# Patient Record
Sex: FEMALE | Race: WHITE | NOT HISPANIC OR LATINO | Employment: OTHER
[De-identification: names, ages, dates, MRNs, and addresses within clinical notes are randomized per-mention and may not be internally consistent; named-entity substitution may affect disease eponyms.]

---

## 2017-06-10 ENCOUNTER — HEALTH MAINTENANCE LETTER (OUTPATIENT)
Age: 57
End: 2017-06-10

## 2019-09-30 ENCOUNTER — HEALTH MAINTENANCE LETTER (OUTPATIENT)
Age: 59
End: 2019-09-30

## 2021-01-15 ENCOUNTER — HEALTH MAINTENANCE LETTER (OUTPATIENT)
Age: 61
End: 2021-01-15

## 2021-10-24 ENCOUNTER — HEALTH MAINTENANCE LETTER (OUTPATIENT)
Age: 61
End: 2021-10-24

## 2022-02-13 ENCOUNTER — HEALTH MAINTENANCE LETTER (OUTPATIENT)
Age: 62
End: 2022-02-13

## 2022-05-23 ENCOUNTER — LAB REQUISITION (OUTPATIENT)
Dept: LAB | Facility: CLINIC | Age: 62
End: 2022-05-23

## 2022-05-23 LAB
CRP SERPL HS-MCNC: 0.8 MG/L
ESTRADIOL SERPL-MCNC: 13 PG/ML
FSH SERPL-ACNC: 129.1 IU/L
PROGEST SERPL-MCNC: <0.2 NG/ML

## 2022-05-23 PROCEDURE — 82306 VITAMIN D 25 HYDROXY: CPT | Performed by: FAMILY MEDICINE

## 2022-05-23 PROCEDURE — 83695 ASSAY OF LIPOPROTEIN(A): CPT | Performed by: FAMILY MEDICINE

## 2022-05-23 PROCEDURE — 83001 ASSAY OF GONADOTROPIN (FSH): CPT | Performed by: FAMILY MEDICINE

## 2022-05-23 PROCEDURE — 84144 ASSAY OF PROGESTERONE: CPT | Performed by: FAMILY MEDICINE

## 2022-05-23 PROCEDURE — 84140 ASSAY OF PREGNENOLONE: CPT | Performed by: FAMILY MEDICINE

## 2022-05-23 PROCEDURE — 82679 ASSAY OF ESTRONE: CPT | Performed by: FAMILY MEDICINE

## 2022-05-23 PROCEDURE — 82670 ASSAY OF TOTAL ESTRADIOL: CPT | Performed by: FAMILY MEDICINE

## 2022-05-23 PROCEDURE — 82627 DEHYDROEPIANDROSTERONE: CPT | Performed by: FAMILY MEDICINE

## 2022-05-23 PROCEDURE — 84403 ASSAY OF TOTAL TESTOSTERONE: CPT | Performed by: FAMILY MEDICINE

## 2022-05-23 PROCEDURE — 86141 C-REACTIVE PROTEIN HS: CPT | Performed by: FAMILY MEDICINE

## 2022-05-23 PROCEDURE — 84270 ASSAY OF SEX HORMONE GLOBUL: CPT | Performed by: FAMILY MEDICINE

## 2022-05-24 LAB
DEPRECATED CALCIDIOL+CALCIFEROL SERPL-MC: 37 UG/L (ref 20–75)
DHEA-S SERPL-MCNC: 28 UG/DL (ref 35–430)
SHBG SERPL-SCNC: 204 NMOL/L (ref 30–135)

## 2022-05-25 LAB
TESTOST FREE SERPL-MCNC: 0.04 NG/DL
TESTOST SERPL-MCNC: 10 NG/DL (ref 8–60)

## 2022-05-28 LAB — LPA SERPL-MCNC: 74 MG/DL

## 2022-06-03 LAB — ESTRONE SERPL-MCNC: 8.7 PG/ML

## 2022-06-15 ENCOUNTER — LAB REQUISITION (OUTPATIENT)
Dept: LAB | Facility: CLINIC | Age: 62
End: 2022-06-15

## 2022-06-15 PROCEDURE — 84140 ASSAY OF PREGNENOLONE: CPT | Performed by: FAMILY MEDICINE

## 2022-06-19 LAB — PREG SERPL-MCNC: 38 NG/DL

## 2022-10-16 ENCOUNTER — HEALTH MAINTENANCE LETTER (OUTPATIENT)
Age: 62
End: 2022-10-16

## 2023-03-26 ENCOUNTER — HEALTH MAINTENANCE LETTER (OUTPATIENT)
Age: 63
End: 2023-03-26

## 2023-09-23 ENCOUNTER — HOSPITAL ENCOUNTER (EMERGENCY)
Facility: CLINIC | Age: 63
Discharge: HOME | End: 2023-09-23
Attending: EMERGENCY MEDICINE | Admitting: EMERGENCY MEDICINE
Payer: COMMERCIAL

## 2023-09-23 ENCOUNTER — HOSPITAL ENCOUNTER (EMERGENCY)
Facility: CLINIC | Age: 63
Discharge: HOME | End: 2023-09-23
Attending: FAMILY MEDICINE | Admitting: FAMILY MEDICINE
Payer: COMMERCIAL

## 2023-09-23 ENCOUNTER — TELEPHONE (OUTPATIENT)
Dept: OPHTHALMOLOGY | Facility: CLINIC | Age: 63
End: 2023-09-23
Payer: COMMERCIAL

## 2023-09-23 VITALS
RESPIRATION RATE: 18 BRPM | DIASTOLIC BLOOD PRESSURE: 61 MMHG | TEMPERATURE: 97.9 F | SYSTOLIC BLOOD PRESSURE: 115 MMHG | BODY MASS INDEX: 26.98 KG/M2 | HEIGHT: 68 IN | WEIGHT: 178 LBS | OXYGEN SATURATION: 100 % | HEART RATE: 99 BPM

## 2023-09-23 VITALS
TEMPERATURE: 98.1 F | HEART RATE: 68 BPM | DIASTOLIC BLOOD PRESSURE: 80 MMHG | RESPIRATION RATE: 16 BRPM | SYSTOLIC BLOOD PRESSURE: 150 MMHG | OXYGEN SATURATION: 98 % | HEIGHT: 68 IN | BODY MASS INDEX: 27.06 KG/M2

## 2023-09-23 DIAGNOSIS — H57.11 ACUTE RIGHT EYE PAIN: ICD-10-CM

## 2023-09-23 PROCEDURE — 99282 EMERGENCY DEPT VISIT SF MDM: CPT | Performed by: FAMILY MEDICINE

## 2023-09-23 PROCEDURE — 99283 EMERGENCY DEPT VISIT LOW MDM: CPT | Performed by: FAMILY MEDICINE

## 2023-09-23 PROCEDURE — 99282 EMERGENCY DEPT VISIT SF MDM: CPT | Mod: 25

## 2023-09-23 RX ORDER — PROPARACAINE HYDROCHLORIDE 5 MG/ML
2 SOLUTION/ DROPS OPHTHALMIC ONCE
Status: DISCONTINUED | OUTPATIENT
Start: 2023-09-23 | End: 2023-09-23 | Stop reason: HOSPADM

## 2023-09-23 ASSESSMENT — ACTIVITIES OF DAILY LIVING (ADL)
ADLS_ACUITY_SCORE: 33
ADLS_ACUITY_SCORE: 35
ADLS_ACUITY_SCORE: 33
ADLS_ACUITY_SCORE: 33

## 2023-09-23 ASSESSMENT — VISUAL ACUITY
OS: 20/50;WITH CORRECTIVE LENSES
OS: 20/100;WITH CORRECTIVE LENSES
OU: 20/50;WITH CORRECTIVE LENSES
OD: 20/80;WITH CORRECTIVE LENSES
OD: 20/50;WITH CORRECTIVE LENSES
OU: OTHER (SEE COMMENTS)

## 2023-09-23 NOTE — ED PROVIDER NOTES
"  History     Chief Complaint:  Eye Pain       The history is provided by the patient.      Michelle Miramontes is a 63 year old female with a history of diabetes and hypertension who presents with right eye pain. Patient reports she was seen at the eye doctor 3 days ago to get injections for her diabetic retinopathy, macular edema. She reports everything went fine and she has been putting in her eye drops 3-4 times a day since the injection. Patient reports this morning when she woke up she had right eye pain and increased light sensitivity. She says it hurts to move her right eye. She denies vision changes or any discharge from her eye. Patient reports she gets injections every 10 weeks and this was her 67th injection. She reports she has only had one eye infection and that was over 5 years ago. Patient reports her symptoms feel similar to when she had her eye infection. She denies trying at home treatments for pain. She denies fever, chills or sweats as well. She notes her eye doctor is Dr. Chevy El.     Independent Historian:   None - Patient Only    Review of External Notes:   None      Medications:    Xanax  Aspirin   Cholecalciferol     Past Medical History:    Anxiety   Depression   Diabetic retinopathy  Hypertension   Insomnia   Vitamin D deficiency   Menorrhagia    Past Surgical History:    Phacoemulsification clear cornea with standard intraocular lens implant   Tonsillectomy and adenoidectomy   Mammoplasty augmentation        Physical Exam   Patient Vitals for the past 24 hrs:   BP Temp Temp src Pulse Resp SpO2 Height Weight   09/23/23 1238 115/61 -- -- -- -- -- -- --   09/23/23 1237 -- 97.9  F (36.6  C) Temporal 99 18 100 % 1.727 m (5' 8\") 80.7 kg (178 lb)      Physical Exam  SKIN:  Warm, dry.  EYES:  Conjunctivae normal.  Normal extraocular motion.  Visual acuity reviewed.  Pupils equal round and reactive to light.  No fluorescein dye uptake over the right bulbar or palpebral conjunctiva.  Ocular " pressures measured: Right eye averages 9, left eye averages 14.  No ocular discharge.  NEUROLOGIC:  Alert, conversant.  PSYCHIATRIC:  Normal mood.    Emergency Department Course     Laboratory:  Labs Ordered and Resulted from Time of ED Arrival to Time of ED Departure - No data to display         Emergency Department Course & Assessments:         Interventions:  Medications   proparacaine (ALCAINE) 0.5 % ophthalmic solution 2 drop (has no administration in time range)   fluorescein (FUL-NINO) ophthalmic strip 1 strip (has no administration in time range)        Assessments:  1342 I obtained history and examined the patient as noted above.   1503 I rechecked and updated the patient.     Independent Interpretation (X-rays, CTs, rhythm strip):  None    Consultations/Discussion of Management or Tests:  1447 I spoke with Dr. Casillas, Ophthalmology, regarding the patient's history and presentation in the emergency department today.           Social Determinants of Health affecting care:   None    Disposition:  The patient was discharged to home.     Impression & Plan    CMS Diagnoses: None      Medical Decision Making:  This patient presents with concern of photophobia and right ocular pain after her recent ocular globe injection.  Raise concern for endophthalmitis.  Patient does lack fever.  Right eye is not per se swollen and there is no obvious infectious findings on examination.  Considered acute angle glaucoma although ocular pressures were reassuring and bilaterally comparable.  No findings of corneal or conjunctival inflammation or infection.  After ophthalmology consultation it was determined the best course of action at this point is assessment at the [facility] emergency department so ophthalmology can examine the patient.  The patient is agreeable with this plan.      Diagnosis:    ICD-10-CM    1. Acute right eye pain  H57.11            Discharge Medications:  New Prescriptions    No medications on file           Scribe Disclosure:  I, PRINCESS GUILLERMINA, am serving as a scribe at 1:46 PM on 9/23/2023 to document services personally performed by Nigel Young MD based on my observations and the provider's statements to me.     9/23/2023   Nigel Young MD Moe, James Thomas, MD  09/23/23 6605

## 2023-09-23 NOTE — ED TRIAGE NOTES
Pt from [facility] ER for opthalmology consult. R eye pain started early this a.m, light sensitivity. Hx of diabetic retinopathy, receiving eye injections regularly.      Triage Assessment       Row Name 09/23/23 1631       Triage Assessment (Adult)    Airway WDL WDL       Respiratory WDL    Respiratory WDL WDL       Skin Circulation/Temperature WDL    Skin Circulation/Temperature WDL WDL       Cardiac WDL    Cardiac WDL WDL       Peripheral/Neurovascular WDL    Peripheral Neurovascular WDL WDL       Cognitive/Neuro/Behavioral WDL    Cognitive/Neuro/Behavioral WDL WDL

## 2023-09-23 NOTE — ED TRIAGE NOTES
Pt was seen at eye doctor for injections for diabetic retinopathy, macular edema. Pt states she woke up with morning with eye pain, increased light sensitivity. Pt called the clinic and was advised to go to ED for further assessment.

## 2023-09-23 NOTE — TELEPHONE ENCOUNTER
Paged by patient regarding worsening eye pain since intravitreal injection with Dr. El at [facility] on 9/21/2023. Patient states that this is her 67th injection. Patient denies any vision changes. Denies flashes or floaters.     Counseled patient that it is difficult to provide recommendations without evaluating the patient. Patient agreeable to being seen in the emergency department.     Deysi Casillas MD  PGY-2   Department of Ophthalmology

## 2023-09-23 NOTE — CONSULTS
"  OPHTHALMOLOGY CONSULT NOTE  09/23/23    Patient: Michelle Miramontes  Consulted by: ED  Reason for Consult: eye pain after intravitreal injection    HISTORY OF PRESENTING ILLNESS:     Michelle Miramontes is a 63 year old female with history of diabetic retinopathy with macular edema who presents with worsening eye pain after an intravitreal injection 9/21/2023.     Received intravitreal injection Thursday morning. Everything was as usual Thursday and Friday. However, woke up this morning with eye pain photosensitivity that has been getting worse, prompting visit to the ED. Patient reports an \"infection\" after an injection procedure five years ago.     Denies any vision changes.     Review of systems were otherwise negative except for that which has been stated above.      OCULAR/MEDICAL/SURGICAL HISTORIES:     Past Ocular History:  Last eye exam: 9/21/2023  Prior eye surgery/laser: Cataract surgery of the right eye, PRP of the right eye   Contact lens wear: None  Glasses: Yes  Eyedrops: artificial tears, pred forte (her post-injection drops)     Family History:  Denies a family history of glaucoma or AMD     Social History:  Social History     Tobacco Use    Smoking status: Never    Smokeless tobacco: Never   Substance Use Topics    Alcohol use: Yes    Drug use: No         Past Medical History:   Diagnosis Date    Anxiety     Closed fracture of fourth metatarsal bone     right    Depression     Diabetic retinopathy (H)     Multiple lasers in both eyes.    Granuloma annulare     H/O scarlet fever     Hip pain associated with recalled total hip arthroplasty hardware (H)     left    Hypertension     Insomnia     Menorrhagia     Mitral regurgitation     Retinopathy     Shoulder pain     left    Tricuspid regurgitation     Vaginal flatus     Vitamin D deficiency        Past Surgical History:   Procedure Laterality Date    ENDOMETRIAL SAMPLING (BIOPSY)  2/2012    benign    KERATOTOMY ARCUATE WITH FEMTOSECOND LASER/IMAGING FOR " ATIOL Right 5/9/2016    Procedure: KERATOTOMY ARCUATE WITH FEMTOSECOND LASER/IMAGING FOR ATIOL;  Surgeon: Evan Kwok MD;  [facility]    MAMMOPLASTY AUGMENTATION      PHACOEMULSIFICATION CLEAR CORNEA WITH STANDARD INTRAOCULAR LENS IMPLANT Right 5/9/2016    Procedure: PHACOEMULSIFICATION CLEAR CORNEA WITH STANDARD INTRAOCULAR LENS IMPLANT;  Surgeon: Evan Kwok MD;  Location:  [facility]    tonsil and adenoidectomy      wisdom teeth extraction  2000       EXAMINATION:     Base Eye Exam       Visual Acuity (Snellen - Linear)         Right Left    Dist cc 20/25     Dist ph sc  20/40    Dist ph cc 20/20 20/40    Near cc 20/40 20/100              Tonometry (Tonopen, 5:48 PM)         Right Left    Pressure 7 12              Pupils         Pupils APD    Right PERRL None    Left PERRL None   sluggish             Visual Fields         Left Right     Full Full              Extraocular Movement         Right Left     Full, Ortho Full, Ortho              Neuro/Psych       Oriented x3: Yes    Mood/Affect: Normal              Dilation       Both eyes: 2.5% Harjinder Synephrine, 1.0% Mydriacyl @ 5:48 PM                  Slit Lamp and Fundus Exam       External Exam         Right Left    External Normal Normal              Slit Lamp Exam         Right Left    Lids/Lashes Normal Normal    Conjunctiva/Sclera White and quiet White and quiet    Cornea Clear Clear    Anterior Chamber Deep and quiet Deep and quiet    Iris Round and reactive Round and reactive    Lens PCIOL 2+ Nuclear sclerosis cataract, 1+ Posterior subcapsular cataract    Anterior Vitreous vitreous syneresis, pigmented cells vitreous syneresis              Fundus Exam         Right Left    Disc Normal Normal    C/D Ratio 0.2 0.2    Macula temporal ma and dbh and focal- laser scars temporal ma and dbh- focal laser scars    Vessels Normal Normal    Periphery 360 prp Scattered dot blot hemorrhage in 3 quads                    Labs/Studies/Imaging Performed:  B  scan:  Right eye: Formed globe, lens in place, no vitritis, no vitreous debris, no retinal detachment   Left eye:  Formed globe, lens in place, no vitritis, no vitreous debris, no retinal detachment      ASSESSMENT/PLAN:     Michelle Miramontes is a 63 year old female who presents with     # Eye pain with photophobia, right eye   - Patient presents with concern for post-injection infection   - Vision 20/20 and IOP okay, no RAPD OU  - No tenderness to palpation   - B-scan and clinical exam without vitritis or signs of endophthalmitis  - No corneal edema, no conjunctival injection, no chemosis, no hypopyon   - Given exam findings listed above, do not suspect infectious process at this time  - may be due to dry eyes due to irritation from betadine    RECOMMENDATIONS:  - No acute intervention at this time  - Stop pred forte   - start preservative free artifical tears QID prn OU  - Provided patient endophthalmitis return precautions  - Follow up with Dr. El as scheduled   - counseled return/RD precautions    It is our pleasure to participate in this patient's care and treatment. Please contact us with any further questions or concerns.    Patient discussed with senior resident Dr. Gleason.    Deysi Casillas MD  Resident Physician, PGY-2  Department of Ophthalmology

## 2023-09-23 NOTE — DISCHARGE INSTRUCTIONS
Go to the [place] emergency department for ophthalmology examination/assessment     [address] [place] [address]

## 2023-09-23 NOTE — ED PROVIDER NOTES
ED Provider Note  [facility]      History     Chief Complaint   Patient presents with    Eye Pain     HPI  Michelle Miramontes is a 63 year old female with PMH of diabetic retinopathy (receiving eye injections regularly) and HTN who presents to the ED with right eye pain. She was seen earlier today in [facility] ED for same issue, sent here for Optho consult.   Patient had injection Thursday normal symptoms afterwards and then is now noted especially today waking up having increasing eye pain etc.  Was evaluated at [facility] ED and now sent here for further evaluation by ophthalmology.  Patient denies headache nausea vomiting double vision no lasting lites no curtainlike effect or central or peripheral loss of vision.  No rash no trauma no other neuro changes now presents for evaluation.    Past Medical History  Past Medical History:   Diagnosis Date    Anxiety     Closed fracture of fourth metatarsal bone     right    Depression     Diabetic retinopathy (H)     Multiple lasers in both eyes.    Granuloma annulare     H/O scarlet fever     Hip pain associated with recalled total hip arthroplasty hardware (H)     left    Hypertension     Insomnia     Menorrhagia     Mitral regurgitation     Retinopathy     Shoulder pain     left    Tricuspid regurgitation     Vaginal flatus     Vitamin D deficiency      Past Surgical History:   Procedure Laterality Date    ENDOMETRIAL SAMPLING (BIOPSY)  2/2012    benign    KERATOTOMY ARCUATE WITH FEMTOSECOND LASER/IMAGING FOR ATIOL Right 5/9/2016    Procedure: KERATOTOMY ARCUATE WITH FEMTOSECOND LASER/IMAGING FOR ATIOL;  Surgeon: Evan Kwok MD;  Location:  [facility]    MAMMOPLASTY AUGMENTATION      PHACOEMULSIFICATION CLEAR CORNEA WITH STANDARD INTRAOCULAR LENS IMPLANT Right 5/9/2016    Procedure: PHACOEMULSIFICATION CLEAR CORNEA WITH STANDARD INTRAOCULAR LENS IMPLANT;  Surgeon: Evan Kwok MD;  Location: [facility]    tonsil and adenoidectomy      gemma  "teeth extraction  2000     acetone, Urine, test STRP  ALPRAZolam (XANAX PO)  aspirin 81 MG tablet  fenofibrate 160 MG tablet  glucagon 1 MG injection  insulin lispro (HUMALOG) 100 UNIT/ML VIAL  liraglutide (VICTOZA) 18 MG/3ML soln  MAGNESIUM GLYCINATE PLUS PO  Oleic Acid LIQD  omega-3 acid ethyl esters (LOVAZA) 1 G capsule  Omega-3 Fatty Acids (OMEGA-3 FISH OIL PO)  UNABLE TO FIND  VITAMIN D, CHOLECALCIFEROL, PO      Allergies   Allergen Reactions    Pcn [Penicillins]      Family History  Family History   Problem Relation Age of Onset    Glaucoma Mother     Coronary Artery Disease Mother     Hyperlipidemia Mother     Osteoporosis Mother     Macular Degeneration No family hx of     Coronary Artery Disease Father     Depression Father     Hyperlipidemia Father     Depression Brother     Hyperlipidemia Brother      Social History   Social History     Tobacco Use    Smoking status: Never    Smokeless tobacco: Never   Substance Use Topics    Alcohol use: Yes    Drug use: No      Past medical history, past surgical history, medications, allergies, family history, and social history were reviewed with the patient. No additional pertinent items.      A medically appropriate review of systems was performed with pertinent positives and negatives noted in the HPI, and all other systems negative.    Physical Exam   BP: (!) 150/80  Pulse: 68  Temp: 98.1  F (36.7  C)  Resp: 16  Height: 172.7 cm (5' 8\")  SpO2: 98 %  Physical Exam  Vitals and nursing note reviewed.   Constitutional:       General: She is in acute distress.      Appearance: Normal appearance. She is well-developed. She is not toxic-appearing.   HENT:      Head: Normocephalic and atraumatic.   Eyes:      General: No scleral icterus.     Extraocular Movements: Extraocular movements intact.      Pupils: Pupils are equal, round, and reactive to light.      Comments: Patient has minimal injection of the right eye but no periorbital swelling EXTR ocular movements appear " to be intact   Cardiovascular:      Rate and Rhythm: Normal rate.   Pulmonary:      Effort: Pulmonary effort is normal. No respiratory distress.   Abdominal:      General: Abdomen is flat.   Musculoskeletal:      Cervical back: Normal range of motion and neck supple.   Skin:     General: Skin is warm and dry.      Capillary Refill: Capillary refill takes less than 2 seconds.      Findings: No bruising, erythema or rash.   Neurological:      General: No focal deficit present.      Mental Status: She is alert and oriented to person, place, and time.   Psychiatric:      Comments: Appropriate here in the ER           ED Course, Procedures, & Data      Records reviewed patient's ER visit today seen by Dr. Young at [facility].  Other clinic visits for follow-up evaluation by endocrinology also type 1 diabetes with nephropathy patient also with macular edema in the past.    As noted here in the ER we consulted with ophthalmology who did come and see the patient with extensive evaluation.    There evaluation did not reveal any significant findings or any concerning abnormalities at this point.    At this point patient stable no obvious signs of any type of infectious process or other structural abnormalities noted.  At this point patient will be discharged monitoring symptoms and return if worsening symptoms at all and following up with her ophthalmologist patient agrees with plan of discharge.    Procedures                     No results found for any visits on 09/23/23.  Medications - No data to display  Labs Ordered and Resulted from Time of ED Arrival to Time of ED Departure - No data to display  No orders to display          Critical care was not performed.     Medical Decision Making  The patient's presentation was of moderate complexity (a chronic illness mild to moderate exacerbation, progression, or side effect of treatment).    The patient's evaluation involved:  review of external note(s) from 3+ sources  (see separate area of note for details)  review of 3+ test result(s) ordered prior to this encounter (see separate area of note for details)  discussion of management or test interpretation with another health professional (see separate area of note for details)    The patient's management necessitated moderate risk (prescription drug management including medications given in the ED).    Assessment & Plan   63-year-old female with history of diabetic retinopathy receives eye injections regularly had one Thursday normal reaction Thursday and Friday but now increasing pain although no real visual changes some increasing redness seen at [facility] transferred to the [place] ER for ophthalmology to evaluate for potential infectious etiologies etc.  Extensive valuation did not reveal any obvious defect or other concerns at this point.  Patient otherwise stable will be discharged home per ophthalmology recommendations and follow-up with their ophthalmologist and return if worsening symptoms such as vision changes increasing redness of the eye fever swelling drainage etc.       I have reviewed the nursing notes. I have reviewed the findings, diagnosis, plan and need for follow up with the patient.    Discharge Medication List as of 9/23/2023  7:03 PM          Final diagnoses:   Acute right eye pain   IEdouard, am serving as a trained medical scribe to document services personally performed by Kannan Sanford MD based on the provider's statements to me on September 23, 2023.  This document has been checked and approved by the attending provider.    IKannan MD, was physically present and have reviewed and verified the accuracy of this note documented by Edouard Castillo medical scribe.      Kannan Sanford MD  [facility] EMERGENCY DEPARTMENT  9/23/2023    This note was created at least in part by the use of dragon voice dictation system. Inadvertent typographical errors may still  exist.  Kannan Sanford MD.  Patient evaluated in the emergency department during the COVID-19 pandemic period. Careful attention to patients safety was addressed throughout the evaluation. Evaluation and treatment management was initiated with disposition made efficiently and appropriate as possible to minimize any risk of potential exposure to patient during this evaluation.       Kannan Sanford MD  09/23/23 [id number]

## 2023-09-24 NOTE — DISCHARGE INSTRUCTIONS
Home.  You were seen by ophthalmology in the ER who evaluated your eye.  Unclear as the cause.  OK home with follow up with your ophthalmology group next week  Return if severe pain, vision changes, eye redness or swelling concerns.

## 2023-11-04 ENCOUNTER — HEALTH MAINTENANCE LETTER (OUTPATIENT)
Age: 63
End: 2023-11-04

## 2024-01-24 ENCOUNTER — TRANSCRIBE ORDERS (OUTPATIENT)
Dept: OTHER | Age: 64
End: 2024-01-24

## 2024-01-24 DIAGNOSIS — I25.10 CORONARY ARTERY DISEASE DUE TO CALCIFIED CORONARY LESION: Primary | ICD-10-CM

## 2024-01-24 DIAGNOSIS — I25.84 CORONARY ARTERY DISEASE DUE TO CALCIFIED CORONARY LESION: Primary | ICD-10-CM

## 2024-01-26 ENCOUNTER — TRANSCRIBE ORDERS (OUTPATIENT)
Dept: OTHER | Age: 64
End: 2024-01-26

## 2024-01-29 ENCOUNTER — TRANSCRIBE ORDERS (OUTPATIENT)
Dept: OTHER | Age: 64
End: 2024-01-29

## 2024-01-29 DIAGNOSIS — Z95.5 S/P CORONARY ARTERY STENT PLACEMENT: Primary | ICD-10-CM

## 2024-01-29 DIAGNOSIS — I25.10 CAD (CORONARY ARTERY DISEASE): ICD-10-CM

## 2024-02-21 ENCOUNTER — HOSPITAL ENCOUNTER (OUTPATIENT)
Dept: CARDIAC REHAB | Facility: CLINIC | Age: 64
Discharge: HOME | End: 2024-02-21
Attending: NURSE PRACTITIONER
Payer: COMMERCIAL

## 2024-02-21 DIAGNOSIS — Z95.5 S/P CORONARY ARTERY STENT PLACEMENT: ICD-10-CM

## 2024-02-21 DIAGNOSIS — I25.10 CAD (CORONARY ARTERY DISEASE): ICD-10-CM

## 2024-02-21 PROCEDURE — 93798 PHYS/QHP OP CAR RHAB W/ECG: CPT

## 2024-02-21 PROCEDURE — 93797 PHYS/QHP OP CAR RHAB WO ECG: CPT | Mod: 59

## 2024-02-23 ENCOUNTER — HOSPITAL ENCOUNTER (OUTPATIENT)
Dept: CARDIAC REHAB | Facility: CLINIC | Age: 64
Discharge: HOME | End: 2024-02-23
Attending: INTERNAL MEDICINE
Payer: COMMERCIAL

## 2024-02-23 PROCEDURE — 93798 PHYS/QHP OP CAR RHAB W/ECG: CPT

## 2024-02-26 ENCOUNTER — HOSPITAL ENCOUNTER (OUTPATIENT)
Dept: CARDIAC REHAB | Facility: CLINIC | Age: 64
Discharge: HOME | End: 2024-02-26
Attending: INTERNAL MEDICINE
Payer: COMMERCIAL

## 2024-02-26 PROCEDURE — 93798 PHYS/QHP OP CAR RHAB W/ECG: CPT | Performed by: REHABILITATION PRACTITIONER

## 2024-02-28 ENCOUNTER — HOSPITAL ENCOUNTER (OUTPATIENT)
Dept: CARDIAC REHAB | Facility: CLINIC | Age: 64
Discharge: HOME | End: 2024-02-28
Attending: INTERNAL MEDICINE
Payer: COMMERCIAL

## 2024-02-28 PROCEDURE — 93797 PHYS/QHP OP CAR RHAB WO ECG: CPT | Mod: 59

## 2024-02-28 PROCEDURE — 93798 PHYS/QHP OP CAR RHAB W/ECG: CPT

## 2024-02-29 ENCOUNTER — HOSPITAL ENCOUNTER (OUTPATIENT)
Dept: CARDIAC REHAB | Facility: CLINIC | Age: 64
Discharge: HOME | End: 2024-02-29
Attending: INTERNAL MEDICINE
Payer: COMMERCIAL

## 2024-02-29 PROCEDURE — 93798 PHYS/QHP OP CAR RHAB W/ECG: CPT | Performed by: REHABILITATION PRACTITIONER

## 2024-03-04 ENCOUNTER — HOSPITAL ENCOUNTER (OUTPATIENT)
Dept: CARDIAC REHAB | Facility: CLINIC | Age: 64
Discharge: HOME | End: 2024-03-04
Attending: INTERNAL MEDICINE
Payer: COMMERCIAL

## 2024-03-04 PROCEDURE — 93798 PHYS/QHP OP CAR RHAB W/ECG: CPT

## 2024-03-11 ENCOUNTER — HOSPITAL ENCOUNTER (OUTPATIENT)
Dept: CARDIAC REHAB | Facility: CLINIC | Age: 64
Discharge: HOME | End: 2024-03-11
Attending: INTERNAL MEDICINE
Payer: COMMERCIAL

## 2024-03-11 PROCEDURE — 93798 PHYS/QHP OP CAR RHAB W/ECG: CPT | Performed by: REHABILITATION PRACTITIONER

## 2024-03-13 ENCOUNTER — HOSPITAL ENCOUNTER (OUTPATIENT)
Dept: CARDIAC REHAB | Facility: CLINIC | Age: 64
Discharge: HOME | End: 2024-03-13
Attending: INTERNAL MEDICINE
Payer: COMMERCIAL

## 2024-03-13 LAB
GLUCOSE BLDC GLUCOMTR-MCNC: 204 MG/DL (ref 70–99)
GLUCOSE BLDC GLUCOMTR-MCNC: 228 MG/DL (ref 70–99)

## 2024-03-13 PROCEDURE — 93798 PHYS/QHP OP CAR RHAB W/ECG: CPT

## 2024-03-15 ENCOUNTER — HOSPITAL ENCOUNTER (OUTPATIENT)
Dept: CARDIAC REHAB | Facility: CLINIC | Age: 64
Discharge: HOME | End: 2024-03-15
Attending: INTERNAL MEDICINE
Payer: COMMERCIAL

## 2024-03-15 PROCEDURE — 93798 PHYS/QHP OP CAR RHAB W/ECG: CPT

## 2024-03-25 ENCOUNTER — HOSPITAL ENCOUNTER (OUTPATIENT)
Dept: CARDIAC REHAB | Facility: CLINIC | Age: 64
Discharge: HOME | End: 2024-03-25
Attending: INTERNAL MEDICINE
Payer: COMMERCIAL

## 2024-03-25 PROCEDURE — 93798 PHYS/QHP OP CAR RHAB W/ECG: CPT | Performed by: REHABILITATION PRACTITIONER

## 2024-03-27 ENCOUNTER — HOSPITAL ENCOUNTER (OUTPATIENT)
Dept: CARDIAC REHAB | Facility: CLINIC | Age: 64
Discharge: HOME | End: 2024-03-27
Attending: INTERNAL MEDICINE
Payer: COMMERCIAL

## 2024-03-27 PROCEDURE — 93798 PHYS/QHP OP CAR RHAB W/ECG: CPT | Performed by: OCCUPATIONAL THERAPIST

## 2024-03-27 PROCEDURE — 93797 PHYS/QHP OP CAR RHAB WO ECG: CPT | Mod: 59 | Performed by: OCCUPATIONAL THERAPIST

## 2024-04-01 ENCOUNTER — HOSPITAL ENCOUNTER (OUTPATIENT)
Dept: CARDIAC REHAB | Facility: CLINIC | Age: 64
Discharge: HOME | End: 2024-04-01
Attending: INTERNAL MEDICINE
Payer: COMMERCIAL

## 2024-04-01 PROCEDURE — 93798 PHYS/QHP OP CAR RHAB W/ECG: CPT

## 2024-04-03 ENCOUNTER — HOSPITAL ENCOUNTER (OUTPATIENT)
Dept: CARDIAC REHAB | Facility: CLINIC | Age: 64
Discharge: HOME | End: 2024-04-03
Attending: INTERNAL MEDICINE
Payer: COMMERCIAL

## 2024-04-03 PROCEDURE — 93798 PHYS/QHP OP CAR RHAB W/ECG: CPT

## 2024-04-10 ENCOUNTER — HOSPITAL ENCOUNTER (OUTPATIENT)
Dept: CARDIAC REHAB | Facility: CLINIC | Age: 64
Discharge: HOME | End: 2024-04-10
Attending: INTERNAL MEDICINE
Payer: COMMERCIAL

## 2024-04-10 PROCEDURE — 93798 PHYS/QHP OP CAR RHAB W/ECG: CPT | Performed by: REHABILITATION PRACTITIONER

## 2024-04-10 PROCEDURE — 93797 PHYS/QHP OP CAR RHAB WO ECG: CPT | Mod: 59 | Performed by: REHABILITATION PRACTITIONER

## 2024-04-12 ENCOUNTER — HOSPITAL ENCOUNTER (OUTPATIENT)
Dept: CARDIAC REHAB | Facility: CLINIC | Age: 64
Discharge: HOME | End: 2024-04-12
Attending: INTERNAL MEDICINE
Payer: COMMERCIAL

## 2024-04-12 PROCEDURE — 93798 PHYS/QHP OP CAR RHAB W/ECG: CPT | Performed by: REHABILITATION PRACTITIONER

## 2024-06-01 ENCOUNTER — HEALTH MAINTENANCE LETTER (OUTPATIENT)
Age: 64
End: 2024-06-01

## 2024-10-01 ENCOUNTER — LAB REQUISITION (OUTPATIENT)
Dept: LAB | Facility: CLINIC | Age: 64
End: 2024-10-01

## 2024-10-01 DIAGNOSIS — Z12.4 ENCOUNTER FOR SCREENING FOR MALIGNANT NEOPLASM OF CERVIX: ICD-10-CM

## 2024-10-01 PROCEDURE — 87624 HPV HI-RISK TYP POOLED RSLT: CPT | Performed by: OBSTETRICS & GYNECOLOGY

## 2024-10-01 PROCEDURE — G0145 SCR C/V CYTO,THINLAYER,RESCR: HCPCS | Performed by: OBSTETRICS & GYNECOLOGY

## 2024-10-02 LAB
HPV HR 12 DNA CVX QL NAA+PROBE: NEGATIVE
HPV16 DNA CVX QL NAA+PROBE: NEGATIVE
HPV18 DNA CVX QL NAA+PROBE: NEGATIVE
HUMAN PAPILLOMA VIRUS FINAL DIAGNOSIS: NORMAL

## 2024-10-04 LAB
BKR AP ASSOCIATED HPV REPORT: NORMAL
BKR LAB AP GYN ADEQUACY: NORMAL
BKR LAB AP GYN INTERPRETATION: NORMAL
BKR LAB AP LMP: NORMAL
BKR LAB AP PREVIOUS ABNL DX: NORMAL
BKR LAB AP PREVIOUS ABNORMAL: NORMAL
PATH REPORT.COMMENTS IMP SPEC: NORMAL
PATH REPORT.COMMENTS IMP SPEC: NORMAL
PATH REPORT.RELEVANT HX SPEC: NORMAL

## 2025-06-14 ENCOUNTER — HEALTH MAINTENANCE LETTER (OUTPATIENT)
Age: 65
End: 2025-06-14